# Patient Record
Sex: MALE | Race: BLACK OR AFRICAN AMERICAN | URBAN - METROPOLITAN AREA
[De-identification: names, ages, dates, MRNs, and addresses within clinical notes are randomized per-mention and may not be internally consistent; named-entity substitution may affect disease eponyms.]

---

## 2023-01-11 ENCOUNTER — TELEPHONE (OUTPATIENT)
Dept: PSYCHIATRY | Facility: CLINIC | Age: 23
End: 2023-01-11

## 2023-01-11 NOTE — TELEPHONE ENCOUNTER
Client called following a discharge from [facility]  He thought [facility] set him up with MHOP services at [facility]  Writer looked for appointments with [facility] and located a missed appointment with the D&A team  Patient states that D&A is not needed, he only wants MHOP services  Advised patient to have [place] send the discharge paperwork  Writer will call back to schedule once paperwork is received